# Patient Record
Sex: FEMALE | Race: WHITE | Employment: PART TIME | URBAN - METROPOLITAN AREA
[De-identification: names, ages, dates, MRNs, and addresses within clinical notes are randomized per-mention and may not be internally consistent; named-entity substitution may affect disease eponyms.]

---

## 2023-10-18 ENCOUNTER — OFFICE VISIT (OUTPATIENT)
Age: 68
End: 2023-10-18
Payer: OTHER GOVERNMENT

## 2023-10-18 VITALS
DIASTOLIC BLOOD PRESSURE: 84 MMHG | BODY MASS INDEX: 24.73 KG/M2 | SYSTOLIC BLOOD PRESSURE: 134 MMHG | WEIGHT: 131 LBS | HEIGHT: 61 IN | HEART RATE: 75 BPM

## 2023-10-18 DIAGNOSIS — R06.02 SHORTNESS OF BREATH: ICD-10-CM

## 2023-10-18 DIAGNOSIS — Z01.818 PRE-OP EXAM: ICD-10-CM

## 2023-10-18 DIAGNOSIS — E78.00 PURE HYPERCHOLESTEROLEMIA: ICD-10-CM

## 2023-10-18 DIAGNOSIS — R94.31 ABNORMAL EKG: Primary | ICD-10-CM

## 2023-10-18 DIAGNOSIS — I47.9 TACHYCARDIA, PAROXYSMAL (HCC): ICD-10-CM

## 2023-10-18 DIAGNOSIS — R94.31 EKG ABNORMALITIES: ICD-10-CM

## 2023-10-18 PROCEDURE — 99204 OFFICE O/P NEW MOD 45 MIN: CPT | Performed by: INTERNAL MEDICINE

## 2023-10-18 PROCEDURE — 93000 ELECTROCARDIOGRAM COMPLETE: CPT | Performed by: INTERNAL MEDICINE

## 2023-10-18 PROCEDURE — 1123F ACP DISCUSS/DSCN MKR DOCD: CPT | Performed by: INTERNAL MEDICINE

## 2023-10-18 RX ORDER — BUPROPION HYDROCHLORIDE 75 MG/1
75 TABLET ORAL DAILY
COMMUNITY
Start: 2023-09-20

## 2023-10-18 RX ORDER — ESCITALOPRAM OXALATE 10 MG/1
10 TABLET ORAL DAILY
COMMUNITY
Start: 2022-11-17

## 2023-10-18 RX ORDER — LEVOTHYROXINE SODIUM 0.1 MG/1
100 TABLET ORAL DAILY
Qty: 30 TABLET | Refills: 11 | COMMUNITY
Start: 2023-06-23 | End: 2024-06-22

## 2023-10-18 RX ORDER — MINOXIDIL 7% / PROGESTERONE 0.1% 7; .1 G/100G; G/100G
SOLUTION TOPICAL NIGHTLY
COMMUNITY

## 2023-10-18 RX ORDER — CHLORAL HYDRATE 500 MG
CAPSULE ORAL DAILY
COMMUNITY

## 2023-10-18 NOTE — PROGRESS NOTES
LHC, other stress testing and agreed to proceed with NST in our office. To ER for worsening angina. Plan on definitive LHC for worsening angina. HPL:  follow, check Ca score. Follow. The patient has been instructed to call with any angina or equivalent as reviewed today. All questions were answered with the patient voicing complete understanding. Patient has been instructed and agrees to call our office with any issues or other concerns related to their cardiac condition(s) and/or complaint(s).         Return for Return After Test.       Wanda Bumpers, DO  10/18/2023

## 2023-10-24 ENCOUNTER — HOSPITAL ENCOUNTER (OUTPATIENT)
Dept: CT IMAGING | Age: 68
Discharge: HOME | End: 2023-10-27
Attending: INTERNAL MEDICINE

## 2023-10-24 DIAGNOSIS — R06.02 SHORTNESS OF BREATH: ICD-10-CM

## 2023-10-24 DIAGNOSIS — I47.9 TACHYCARDIA, PAROXYSMAL (HCC): ICD-10-CM

## 2023-10-24 DIAGNOSIS — E78.00 PURE HYPERCHOLESTEROLEMIA: ICD-10-CM

## 2023-10-24 DIAGNOSIS — R94.31 EKG ABNORMALITIES: ICD-10-CM

## 2023-10-24 DIAGNOSIS — R94.31 ABNORMAL EKG: ICD-10-CM

## 2023-10-24 DIAGNOSIS — Z01.818 PRE-OP EXAM: ICD-10-CM

## 2023-10-24 PROCEDURE — 75571 CT HRT W/O DYE W/CA TEST: CPT

## 2023-10-26 ENCOUNTER — TELEPHONE (OUTPATIENT)
Age: 68
End: 2023-10-26

## 2023-10-26 NOTE — TELEPHONE ENCOUNTER
----- Message from Jose Perales DO sent at 10/24/2023  1:38 PM EDT -----  Please call the patient. NST was ok, nothing major or concerning. If having more angina (worsening DENNIS, CP, SOB), please let us know. Will review more at their follow up appointment and get plan for the future.     Thanks,   Anita Cazares

## 2023-10-26 NOTE — TELEPHONE ENCOUNTER
----- Message from Ying Castañeda DO sent at 10/24/2023  1:38 PM EDT -----  Please call the patient. NST was ok, nothing major or concerning. If having more angina (worsening DENNIS, CP, SOB), please let us know. Will review more at their follow up appointment and get plan for the future.     Thanks,   Honey Ok

## 2023-10-26 NOTE — TELEPHONE ENCOUNTER
Cardiac Clearance        Physician or Practice Requesting:Dr. Carmela Gayle  : [facility] Phone Number: [phone]  Fax Number: [phone]  Date of Surgery/Procedure: 11/7/2023  Type of Surgery or Procedure: right knee scope  Type of Anesthesia: general  Type of Clearance Requested: Cardiac Clearance  Medication to Hold:n/a   Days to Hold: n/a    Jenniffer needs a hard copy copy of tomorrows visit

## 2023-10-26 NOTE — TELEPHONE ENCOUNTER
----- Message from Sam Santana DO sent at 10/24/2023  1:38 PM EDT -----  Please call the patient. NST was ok, nothing major or concerning. If having more angina (worsening DENNIS, CP, SOB), please let us know. Will review more at their follow up appointment and get plan for the future.     Thanks,   Abdiel Ambrosio

## 2023-10-27 ENCOUNTER — OFFICE VISIT (OUTPATIENT)
Age: 68
End: 2023-10-27
Payer: OTHER GOVERNMENT

## 2023-10-27 VITALS
SYSTOLIC BLOOD PRESSURE: 122 MMHG | HEART RATE: 77 BPM | BODY MASS INDEX: 24.83 KG/M2 | WEIGHT: 131.5 LBS | HEIGHT: 61 IN | DIASTOLIC BLOOD PRESSURE: 78 MMHG

## 2023-10-27 DIAGNOSIS — R94.31 EKG ABNORMALITIES: ICD-10-CM

## 2023-10-27 DIAGNOSIS — E78.00 PURE HYPERCHOLESTEROLEMIA: Primary | ICD-10-CM

## 2023-10-27 DIAGNOSIS — R06.02 SHORTNESS OF BREATH: ICD-10-CM

## 2023-10-27 DIAGNOSIS — I47.9 TACHYCARDIA, PAROXYSMAL (HCC): ICD-10-CM

## 2023-10-27 PROCEDURE — 99213 OFFICE O/P EST LOW 20 MIN: CPT | Performed by: INTERNAL MEDICINE

## 2023-10-27 PROCEDURE — 1123F ACP DISCUSS/DSCN MKR DOCD: CPT | Performed by: INTERNAL MEDICINE
